# Patient Record
Sex: MALE | Race: WHITE | NOT HISPANIC OR LATINO
[De-identification: names, ages, dates, MRNs, and addresses within clinical notes are randomized per-mention and may not be internally consistent; named-entity substitution may affect disease eponyms.]

---
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
## 2021-07-07 ENCOUNTER — APPOINTMENT (OUTPATIENT)
Dept: DERMATOLOGY | Facility: CLINIC | Age: 21
End: 2021-07-07
Payer: COMMERCIAL

## 2021-07-07 PROCEDURE — 99213 OFFICE O/P EST LOW 20 MIN: CPT

## 2021-07-07 PROCEDURE — 99072 ADDL SUPL MATRL&STAF TM PHE: CPT

## 2021-07-07 RX ORDER — DOXYCYCLINE HYCLATE 100 MG/1
100 CAPSULE ORAL TWICE DAILY
Qty: 60 | Refills: 2 | Status: DISCONTINUED | COMMUNITY
Start: 2020-01-13 | End: 2021-07-07

## 2021-07-07 NOTE — ASSESSMENT
[FreeTextEntry1] : acne stable on duac gel; \par Therapeutic options and their risks and benefits; along with multiple diagnostic possibilities were discussed at length; risks and benefits of further study were discussed;\par \par had significant flare last fall at school;  would avoid doxy again as pt. had GI issues/esophageal irritation; \par  BID prn for flares; take to school; \par Risks and benefits of minocycline were discussed including dizziness;\par \par f/u in winter- sooner prn

## 2021-07-07 NOTE — HISTORY OF PRESENT ILLNESS
[de-identified] : f/u for check of acne;  did very well on duac gel;  took doxy; but had issue with GI irritation from pill\par

## 2021-08-06 ENCOUNTER — TRANSCRIPTION ENCOUNTER (OUTPATIENT)
Age: 21
End: 2021-08-06

## 2021-08-24 ENCOUNTER — TRANSCRIPTION ENCOUNTER (OUTPATIENT)
Age: 21
End: 2021-08-24

## 2022-01-03 ENCOUNTER — TRANSCRIPTION ENCOUNTER (OUTPATIENT)
Age: 22
End: 2022-01-03

## 2022-01-05 ENCOUNTER — APPOINTMENT (OUTPATIENT)
Dept: DERMATOLOGY | Facility: CLINIC | Age: 22
End: 2022-01-05
Payer: COMMERCIAL

## 2022-01-05 PROCEDURE — 99214 OFFICE O/P EST MOD 30 MIN: CPT

## 2022-01-05 NOTE — HISTORY OF PRESENT ILLNESS
[de-identified] : f/u for check of acne; \par  using Duac;  has outbreaks at school\par has MCN prn, but prefers not to take; \par

## 2022-01-05 NOTE — ASSESSMENT
[FreeTextEntry1] : Acne; \par stable now, but prone to exacerbations; \par \par Therapeutic options and their risks and benefits; along with multiple diagnostic possibilities were discussed at length; risks and benefits of further study were discussed;\par \par add tretinoin 0.05 cream;  use both daily;  reserve MCN for severe outbreaks; \par f/u summer, sooner prn

## 2022-01-18 ENCOUNTER — TRANSCRIPTION ENCOUNTER (OUTPATIENT)
Age: 22
End: 2022-01-18

## 2022-05-25 ENCOUNTER — NON-APPOINTMENT (OUTPATIENT)
Age: 22
End: 2022-05-25

## 2022-07-05 ENCOUNTER — APPOINTMENT (OUTPATIENT)
Dept: DERMATOLOGY | Facility: CLINIC | Age: 22
End: 2022-07-05

## 2022-07-05 DIAGNOSIS — D22.4 MELANOCYTIC NEVI OF SCALP AND NECK: ICD-10-CM

## 2022-07-05 DIAGNOSIS — L70.0 ACNE VULGARIS: ICD-10-CM

## 2022-07-05 PROCEDURE — 99214 OFFICE O/P EST MOD 30 MIN: CPT

## 2022-07-05 RX ORDER — CLINDAMYCIN PHOSPHATE AND BENZOYL PEROXIDE 10; 50 MG/G; MG/G
1.2-5 GEL TOPICAL
Qty: 1 | Refills: 5 | Status: ACTIVE | COMMUNITY
Start: 2020-01-13 | End: 1900-01-01

## 2022-07-05 RX ORDER — MINOCYCLINE HYDROCHLORIDE 100 MG/1
100 CAPSULE ORAL TWICE DAILY
Qty: 60 | Refills: 3 | Status: ACTIVE | COMMUNITY
Start: 2021-07-07 | End: 1900-01-01

## 2022-07-05 RX ORDER — TRETINOIN 0.5 MG/G
0.05 CREAM TOPICAL
Qty: 1 | Refills: 3 | Status: ACTIVE | COMMUNITY
Start: 2022-01-05 | End: 1900-01-01

## 2022-07-05 NOTE — PHYSICAL EXAM
[FreeTextEntry3] : Face;  mild residual acne and PIH; \par no nodules; \par \par R anterior neck;  pigmented fleshy papule

## 2022-07-05 NOTE — ASSESSMENT
[FreeTextEntry1] : Acne; \par responding well to topicals; \par Renew Duac/tretinoin; also MCN prn; \par \par \par Therapeutic options and their risks and benefits; along with multiple diagnostic possibilities were discussed at length; risks and benefits of further study were discussed;\par \par nevus R anterior neck; \par + pain, irritation; \par f/u for shave remova;  \par The risks and benefits of the procedure, were explained at length including pain; scar, bleeding; recurrence of lesion(s); need for wound care;\par  \par

## 2022-07-05 NOTE — HISTORY OF PRESENT ILLNESS
[de-identified] : acne on face; \par doing much better on Duac/tretinoin combo; \par takes MCN only on prn basis; \par \par also:  c/o painful mole on neck, desires removal

## 2022-08-02 ENCOUNTER — APPOINTMENT (OUTPATIENT)
Dept: DERMATOLOGY | Facility: CLINIC | Age: 22
End: 2022-08-02

## 2022-11-23 ENCOUNTER — NON-APPOINTMENT (OUTPATIENT)
Age: 22
End: 2022-11-23

## 2023-01-11 ENCOUNTER — APPOINTMENT (OUTPATIENT)
Dept: DERMATOLOGY | Facility: CLINIC | Age: 23
End: 2023-01-11

## 2024-01-05 ENCOUNTER — EMERGENCY (EMERGENCY)
Facility: HOSPITAL | Age: 24
LOS: 0 days | Discharge: HOME | End: 2024-01-05
Attending: STUDENT IN AN ORGANIZED HEALTH CARE EDUCATION/TRAINING PROGRAM
Payer: COMMERCIAL

## 2024-01-05 VITALS
DIASTOLIC BLOOD PRESSURE: 76 MMHG | WEIGHT: 182.98 LBS | TEMPERATURE: 98 F | RESPIRATION RATE: 18 BRPM | HEIGHT: 70 IN | SYSTOLIC BLOOD PRESSURE: 117 MMHG | OXYGEN SATURATION: 100 % | HEART RATE: 74 BPM

## 2024-01-05 VITALS
HEART RATE: 84 BPM | OXYGEN SATURATION: 100 % | DIASTOLIC BLOOD PRESSURE: 69 MMHG | SYSTOLIC BLOOD PRESSURE: 120 MMHG | RESPIRATION RATE: 18 BRPM

## 2024-01-05 DIAGNOSIS — R07.9 CHEST PAIN, UNSPECIFIED: ICD-10-CM

## 2024-01-05 DIAGNOSIS — R07.89 OTHER CHEST PAIN: ICD-10-CM

## 2024-01-05 DIAGNOSIS — I49.1 ATRIAL PREMATURE DEPOLARIZATION: ICD-10-CM

## 2024-01-05 LAB
ALBUMIN SERPL ELPH-MCNC: 4.3 G/DL — SIGNIFICANT CHANGE UP (ref 3.3–5)
ALBUMIN SERPL ELPH-MCNC: 4.3 G/DL — SIGNIFICANT CHANGE UP (ref 3.3–5)
ALP SERPL-CCNC: 77 U/L — SIGNIFICANT CHANGE UP (ref 40–120)
ALP SERPL-CCNC: 77 U/L — SIGNIFICANT CHANGE UP (ref 40–120)
ALT FLD-CCNC: 49 U/L — SIGNIFICANT CHANGE UP (ref 12–78)
ALT FLD-CCNC: 49 U/L — SIGNIFICANT CHANGE UP (ref 12–78)
ANION GAP SERPL CALC-SCNC: 3 MMOL/L — LOW (ref 5–17)
ANION GAP SERPL CALC-SCNC: 3 MMOL/L — LOW (ref 5–17)
AST SERPL-CCNC: 26 U/L — SIGNIFICANT CHANGE UP (ref 15–37)
AST SERPL-CCNC: 26 U/L — SIGNIFICANT CHANGE UP (ref 15–37)
BASOPHILS # BLD AUTO: 0.02 K/UL — SIGNIFICANT CHANGE UP (ref 0–0.2)
BASOPHILS # BLD AUTO: 0.02 K/UL — SIGNIFICANT CHANGE UP (ref 0–0.2)
BASOPHILS NFR BLD AUTO: 0.2 % — SIGNIFICANT CHANGE UP (ref 0–2)
BASOPHILS NFR BLD AUTO: 0.2 % — SIGNIFICANT CHANGE UP (ref 0–2)
BILIRUB SERPL-MCNC: 0.3 MG/DL — SIGNIFICANT CHANGE UP (ref 0.2–1.2)
BILIRUB SERPL-MCNC: 0.3 MG/DL — SIGNIFICANT CHANGE UP (ref 0.2–1.2)
BUN SERPL-MCNC: 25 MG/DL — HIGH (ref 7–23)
BUN SERPL-MCNC: 25 MG/DL — HIGH (ref 7–23)
CALCIUM SERPL-MCNC: 9.3 MG/DL — SIGNIFICANT CHANGE UP (ref 8.5–10.1)
CALCIUM SERPL-MCNC: 9.3 MG/DL — SIGNIFICANT CHANGE UP (ref 8.5–10.1)
CHLORIDE SERPL-SCNC: 106 MMOL/L — SIGNIFICANT CHANGE UP (ref 96–108)
CHLORIDE SERPL-SCNC: 106 MMOL/L — SIGNIFICANT CHANGE UP (ref 96–108)
CO2 SERPL-SCNC: 31 MMOL/L — SIGNIFICANT CHANGE UP (ref 22–31)
CO2 SERPL-SCNC: 31 MMOL/L — SIGNIFICANT CHANGE UP (ref 22–31)
CREAT SERPL-MCNC: 1.2 MG/DL — SIGNIFICANT CHANGE UP (ref 0.5–1.3)
CREAT SERPL-MCNC: 1.2 MG/DL — SIGNIFICANT CHANGE UP (ref 0.5–1.3)
EGFR: 88 ML/MIN/1.73M2 — SIGNIFICANT CHANGE UP
EGFR: 88 ML/MIN/1.73M2 — SIGNIFICANT CHANGE UP
EOSINOPHIL # BLD AUTO: 0.27 K/UL — SIGNIFICANT CHANGE UP (ref 0–0.5)
EOSINOPHIL # BLD AUTO: 0.27 K/UL — SIGNIFICANT CHANGE UP (ref 0–0.5)
EOSINOPHIL NFR BLD AUTO: 3.1 % — SIGNIFICANT CHANGE UP (ref 0–6)
EOSINOPHIL NFR BLD AUTO: 3.1 % — SIGNIFICANT CHANGE UP (ref 0–6)
GLUCOSE SERPL-MCNC: 108 MG/DL — HIGH (ref 70–99)
GLUCOSE SERPL-MCNC: 108 MG/DL — HIGH (ref 70–99)
HCT VFR BLD CALC: 45.2 % — SIGNIFICANT CHANGE UP (ref 39–50)
HCT VFR BLD CALC: 45.2 % — SIGNIFICANT CHANGE UP (ref 39–50)
HGB BLD-MCNC: 15.7 G/DL — SIGNIFICANT CHANGE UP (ref 13–17)
HGB BLD-MCNC: 15.7 G/DL — SIGNIFICANT CHANGE UP (ref 13–17)
IMM GRANULOCYTES NFR BLD AUTO: 0.2 % — SIGNIFICANT CHANGE UP (ref 0–0.9)
IMM GRANULOCYTES NFR BLD AUTO: 0.2 % — SIGNIFICANT CHANGE UP (ref 0–0.9)
LYMPHOCYTES # BLD AUTO: 3.05 K/UL — SIGNIFICANT CHANGE UP (ref 1–3.3)
LYMPHOCYTES # BLD AUTO: 3.05 K/UL — SIGNIFICANT CHANGE UP (ref 1–3.3)
LYMPHOCYTES # BLD AUTO: 35.2 % — SIGNIFICANT CHANGE UP (ref 13–44)
LYMPHOCYTES # BLD AUTO: 35.2 % — SIGNIFICANT CHANGE UP (ref 13–44)
MAGNESIUM SERPL-MCNC: 2.4 MG/DL — SIGNIFICANT CHANGE UP (ref 1.6–2.6)
MAGNESIUM SERPL-MCNC: 2.4 MG/DL — SIGNIFICANT CHANGE UP (ref 1.6–2.6)
MCHC RBC-ENTMCNC: 30.5 PG — SIGNIFICANT CHANGE UP (ref 27–34)
MCHC RBC-ENTMCNC: 30.5 PG — SIGNIFICANT CHANGE UP (ref 27–34)
MCHC RBC-ENTMCNC: 34.7 GM/DL — SIGNIFICANT CHANGE UP (ref 32–36)
MCHC RBC-ENTMCNC: 34.7 GM/DL — SIGNIFICANT CHANGE UP (ref 32–36)
MCV RBC AUTO: 87.8 FL — SIGNIFICANT CHANGE UP (ref 80–100)
MCV RBC AUTO: 87.8 FL — SIGNIFICANT CHANGE UP (ref 80–100)
MONOCYTES # BLD AUTO: 0.72 K/UL — SIGNIFICANT CHANGE UP (ref 0–0.9)
MONOCYTES # BLD AUTO: 0.72 K/UL — SIGNIFICANT CHANGE UP (ref 0–0.9)
MONOCYTES NFR BLD AUTO: 8.3 % — SIGNIFICANT CHANGE UP (ref 2–14)
MONOCYTES NFR BLD AUTO: 8.3 % — SIGNIFICANT CHANGE UP (ref 2–14)
NEUTROPHILS # BLD AUTO: 4.59 K/UL — SIGNIFICANT CHANGE UP (ref 1.8–7.4)
NEUTROPHILS # BLD AUTO: 4.59 K/UL — SIGNIFICANT CHANGE UP (ref 1.8–7.4)
NEUTROPHILS NFR BLD AUTO: 53 % — SIGNIFICANT CHANGE UP (ref 43–77)
NEUTROPHILS NFR BLD AUTO: 53 % — SIGNIFICANT CHANGE UP (ref 43–77)
PLATELET # BLD AUTO: 232 K/UL — SIGNIFICANT CHANGE UP (ref 150–400)
PLATELET # BLD AUTO: 232 K/UL — SIGNIFICANT CHANGE UP (ref 150–400)
POTASSIUM SERPL-MCNC: 3.4 MMOL/L — LOW (ref 3.5–5.3)
POTASSIUM SERPL-MCNC: 3.4 MMOL/L — LOW (ref 3.5–5.3)
POTASSIUM SERPL-SCNC: 3.4 MMOL/L — LOW (ref 3.5–5.3)
POTASSIUM SERPL-SCNC: 3.4 MMOL/L — LOW (ref 3.5–5.3)
PROT SERPL-MCNC: 7.9 GM/DL — SIGNIFICANT CHANGE UP (ref 6–8.3)
PROT SERPL-MCNC: 7.9 GM/DL — SIGNIFICANT CHANGE UP (ref 6–8.3)
RBC # BLD: 5.15 M/UL — SIGNIFICANT CHANGE UP (ref 4.2–5.8)
RBC # BLD: 5.15 M/UL — SIGNIFICANT CHANGE UP (ref 4.2–5.8)
RBC # FLD: 12.3 % — SIGNIFICANT CHANGE UP (ref 10.3–14.5)
RBC # FLD: 12.3 % — SIGNIFICANT CHANGE UP (ref 10.3–14.5)
SODIUM SERPL-SCNC: 140 MMOL/L — SIGNIFICANT CHANGE UP (ref 135–145)
SODIUM SERPL-SCNC: 140 MMOL/L — SIGNIFICANT CHANGE UP (ref 135–145)
TROPONIN I, HIGH SENSITIVITY RESULT: 68.51 NG/L — SIGNIFICANT CHANGE UP
TROPONIN I, HIGH SENSITIVITY RESULT: 68.51 NG/L — SIGNIFICANT CHANGE UP
TROPONIN I, HIGH SENSITIVITY RESULT: 72.31 NG/L — SIGNIFICANT CHANGE UP
TROPONIN I, HIGH SENSITIVITY RESULT: 72.31 NG/L — SIGNIFICANT CHANGE UP
WBC # BLD: 8.67 K/UL — SIGNIFICANT CHANGE UP (ref 3.8–10.5)
WBC # BLD: 8.67 K/UL — SIGNIFICANT CHANGE UP (ref 3.8–10.5)
WBC # FLD AUTO: 8.67 K/UL — SIGNIFICANT CHANGE UP (ref 3.8–10.5)
WBC # FLD AUTO: 8.67 K/UL — SIGNIFICANT CHANGE UP (ref 3.8–10.5)

## 2024-01-05 PROCEDURE — 80053 COMPREHEN METABOLIC PANEL: CPT

## 2024-01-05 PROCEDURE — 36415 COLL VENOUS BLD VENIPUNCTURE: CPT

## 2024-01-05 PROCEDURE — 84484 ASSAY OF TROPONIN QUANT: CPT

## 2024-01-05 PROCEDURE — 99223 1ST HOSP IP/OBS HIGH 75: CPT

## 2024-01-05 PROCEDURE — 71045 X-RAY EXAM CHEST 1 VIEW: CPT

## 2024-01-05 PROCEDURE — 83735 ASSAY OF MAGNESIUM: CPT

## 2024-01-05 PROCEDURE — 85025 COMPLETE CBC W/AUTO DIFF WBC: CPT

## 2024-01-05 PROCEDURE — 93005 ELECTROCARDIOGRAM TRACING: CPT

## 2024-01-05 PROCEDURE — 96374 THER/PROPH/DIAG INJ IV PUSH: CPT

## 2024-01-05 PROCEDURE — 93010 ELECTROCARDIOGRAM REPORT: CPT

## 2024-01-05 PROCEDURE — 99285 EMERGENCY DEPT VISIT HI MDM: CPT

## 2024-01-05 PROCEDURE — 99285 EMERGENCY DEPT VISIT HI MDM: CPT | Mod: 25

## 2024-01-05 PROCEDURE — 71045 X-RAY EXAM CHEST 1 VIEW: CPT | Mod: 26

## 2024-01-05 RX ORDER — ONDANSETRON 8 MG/1
4 TABLET, FILM COATED ORAL ONCE
Refills: 0 | Status: COMPLETED | OUTPATIENT
Start: 2024-01-05 | End: 2024-01-05

## 2024-01-05 RX ORDER — POTASSIUM CHLORIDE 20 MEQ
40 PACKET (EA) ORAL ONCE
Refills: 0 | Status: DISCONTINUED | OUTPATIENT
Start: 2024-01-05 | End: 2024-01-05

## 2024-01-05 RX ADMIN — ONDANSETRON 4 MILLIGRAM(S): 8 TABLET, FILM COATED ORAL at 03:43

## 2024-01-05 NOTE — CONSULT NOTE ADULT - PROBLEM SELECTOR RECOMMENDATION 9
pt had sudden right sided chest pain - serial troponin levels negative, ECG without ST-T wave changes   ACS ruled out. No evidence myocardial damage. no associated sx  He does have hx PACs which were seen on ECG and tele while in ED. Do not suspect severity of chest pain related to PACs. He did take doxycycline before bed without food- this may be due to pill esophagitis.   Stable from cardiac standpoint for DC.

## 2024-01-05 NOTE — CONSULT NOTE ADULT - PROBLEM SELECTOR RECOMMENDATION 2
Pt reported hx frequent PACs; was seen by Dr. Delvalle in 2022 and referred to EP. Ablation was recommended but patient was hesitant to undergo procedure.   Unclear if current sx are related to PACs but pt should follow up with cardiologist as outpatient. would repeat monitor and reassess need for medical tx/ablation     (Offered to have Dr. Delvalle see patient before DC but patient requested to be discharged)

## 2024-01-05 NOTE — ED PROVIDER NOTE - NSFOLLOWUPINSTRUCTIONS_ED_ALL_ED_FT
** Follow up with cardiology.      ** Go to the nearest Emergency Department if you experience any new or concerning symptoms, such as:   - worsening pain  - chest pain  - difficulty breathing  - passing out  - unable to eat or drink  - unable to move or feel part of your body  - fever, chills

## 2024-01-05 NOTE — CONSULT NOTE ADULT - SUBJECTIVE AND OBJECTIVE BOX
CHIEF COMPLAINT: chest pain     HPI:  Iraj is a 22 yo male with a hx frequent PACS presenting with sudden right sided chest pain. Pt reports waking up from sleep with severe pain lasting 2-3 hours. He had no associated sx. Never had this pain before. Denies recent heavy lifting, exercise. Pain was not pleuritic, reproducible or positional. He has not had recent exertional sx. Denies palpitations, dizziness, shortness of breath, LE edema, orthopnea.     He does report taking doxycycline (prescribed for acne) just before bed without food.     He did then note having seen Dr. Delvalle in 2022 for frequent PACS with subsequent referral to EP. Ablation was recommended but patient has been hesitant to undergo procedure.     In the ED, he was afebrile, hemodynamically stable saturating well on room air.   Labs notable for grossly unremarkable CBC, CMP with K 3.4, BUN 25, trop neg x2   CXR unremarkable.   ECG NSR with frequent PACs     PAST MEDICAL / SURGICAL HISTORY:  PAST MEDICAL & SURGICAL HISTORY:      SOCIAL HISTORY:   Alcohol: Denied  Smoking: Nonsmoker  Drug Use: Denied  Marital Status:     FAMILY HISTORY: FAMILY HISTORY:      MEDICATIONS  (STANDING):    MEDICATIONS  (PRN):      Allergies    No Known Allergies    Intolerances        REVIEW OF SYSTEMS:  CONSTITUTIONAL: No weakness, fevers or chills  Eyes: No visual changes  NECK: No pain or stiffness  RESPIRATORY: No cough, wheezing, hemoptysis; No shortness of breath  CARDIOVASCULAR: +chest pain, no palpitations  GASTROINTESTINAL: No abdominal pain. No nausea, vomiting, or hematemesis; No diarrhea or constipation. No melena or hematochezia.  GENITOURINARY: No dysuria, frequency or hematuria  NEUROLOGICAL: No numbness.  SKIN: No itching or rash  All other review of systems is negative unless indicated above    VITAL SIGNS:   Vital Signs Last 24 Hrs  T(C): 36.9 (05 Jan 2024 07:26), Max: 36.9 (05 Jan 2024 07:15)  T(F): 98.5 (05 Jan 2024 07:26), Max: 98.5 (05 Jan 2024 07:15)  HR: 84 (05 Jan 2024 08:28) (74 - 90)  BP: 120/69 (05 Jan 2024 08:28) (117/76 - 128/73)  BP(mean): 82 (05 Jan 2024 08:28) (81 - 87)  RR: 18 (05 Jan 2024 08:28) (18 - 20)  SpO2: 100% (05 Jan 2024 08:28) (100% - 100%)    Parameters below as of 05 Jan 2024 08:28  Patient On (Oxygen Delivery Method): room air        I&O's Summary      PHYSICAL EXAM:  Constitutional: NAD, awake and alert  HEENT:  EOMI,  Pupils round, No oral cyanosis.  Pulmonary: Non-labored, breath sounds are clear bilaterally, No wheezing, rales or rhonchi  Cardiovascular: S1 and S2, regular rate and rhythm, no Murmurs, gallops or rubs  Gastrointestinal: Bowel Sounds present, soft, nontender.   Lymph: No peripheral edema. No cervical lymphadenopathy.  Neurological: Alert, no focal deficits  Skin: No rashes.  Psych:  Mood & affect appropriate    LABS:                        15.7   8.67  )-----------( 232      ( 05 Jan 2024 03:15 )             45.2     05 Jan 2024 03:15    140    |  106    |  25     ----------------------------<  108    3.4     |  31     |  1.20     Ca    9.3        05 Jan 2024 03:15  Mg     2.4       05 Jan 2024 03:15    TPro  7.9    /  Alb  4.3    /  TBili  0.3    /  DBili  x      /  AST  26     /  ALT  49     /  AlkPhos  77     05 Jan 2024 03:15

## 2024-01-05 NOTE — ED ADULT NURSE NOTE - OBJECTIVE STATEMENT
pt presents to ED c/o chest pain. pt states pain woke him up and he was hurdled over from the severity. upon assessment in ED, pain has subsided. pt hx PAC. pt "needs ablation but has been putting it off" as per mother at bedside. pt endorsing nausea and vomiting; pt states "I think I threw up because I got anxious from the pain". pt took 2 TUMS PTA with a little relief. pt denies any other pmhx. pt is alert with no further complaints. EKG done and heart monitoring initiated.

## 2024-01-05 NOTE — ED ADULT TRIAGE NOTE - CHIEF COMPLAINT QUOTE
Patient ambulates to ER from home c/o chest pain. Pt stated that pain to the right side of chest started 1 hour ago. Endorses sob. PMHx PAC. NKA. Pt appeared to be ion no distress in triage. EKG in progress.

## 2024-01-05 NOTE — ED PROVIDER NOTE - PROGRESS NOTE DETAILS
Rosa Whitmore MD, Attending  cards at bedside Rosa Whitmore MD, Attending  Pt's repeat trop negative, but rising. Pt and mother strongly prefer to see cardiology in the AM for second opinion.

## 2024-01-05 NOTE — ED PROVIDER NOTE - CLINICAL SUMMARY MEDICAL DECISION MAKING FREE TEXT BOX
ddx includes, but is not limited to the following: pleurisy, ACS, dysrhythmia, pericarditis, myocarditis, low suspicion for PE    plan: ED chest pain labs, reassess for need for repeat trop, cards consult.     EKG: independently interpreted by me - sinus with frequent PAC, not heart block.

## 2024-01-05 NOTE — ED PROVIDER NOTE - OBJECTIVE STATEMENT
24 yo M hx of PACs, pw acute onset right side chest pain awaking him up from sleep at rougly 1:30AM. Pain was not exertional, pleuritic, positional. episode self-resolved after 2 hours. In the ED, pt well appearing, with nromal vitals. 22 yo M hx of PACs, pw acute onset right side chest pain awaking him up from sleep at rougly 1:30AM. Pain was not exertional, pleuritic, positional. episode self-resolved after 2 hours. In the ED, pt well appearing, with nromal vitals.

## 2024-01-05 NOTE — ED PROVIDER NOTE - PATIENT PORTAL LINK FT
You can access the FollowMyHealth Patient Portal offered by [facility] by registering at the following website: [email or web address]. By joining [employer]’s FollowMyHealth portal, you will also be able to view your health information using other applications (apps) compatible with our system. You can access the FollowMyHealth Patient Portal offered by [facility] by registering at the following website: [email or web address]. By joining [employer]’s FollowMyHealth portal, you will also be able to view your health information using other applications (apps) compatible with our system.

## 2024-02-23 ENCOUNTER — APPOINTMENT (OUTPATIENT)
Dept: ORTHOPEDIC SURGERY | Facility: CLINIC | Age: 24
End: 2024-02-23
Payer: COMMERCIAL

## 2024-02-23 DIAGNOSIS — S86.812A STRAIN OF OTHER MUSCLE(S) AND TENDON(S) AT LOWER LEG LEVEL, LEFT LEG, INITIAL ENCOUNTER: ICD-10-CM

## 2024-02-23 DIAGNOSIS — Z78.9 OTHER SPECIFIED HEALTH STATUS: ICD-10-CM

## 2024-02-23 PROCEDURE — 99203 OFFICE O/P NEW LOW 30 MIN: CPT

## 2024-02-27 PROBLEM — S86.812A STRAIN OF CALF MUSCLE, LEFT, INITIAL ENCOUNTER: Status: ACTIVE | Noted: 2024-02-27

## 2024-02-27 NOTE — IMAGING
[de-identified] : Constitutional: Healthy, and well nourished in no acute distress.  Psych: Calm, cooperative, grossly normal  Eyes: Normal, sclera non-icteric  Ears, Nose, Mouth, Throat: External inspection of nose and ears does not reveal any scars or masses  Head: Normocephalic  Neck: Neck appears supple without sign of limited or painful ROM  Respiratory: Normal effort, no respiratory distress, no cyanosis  Cardiovascular: Visualized extremities without edema or varicosities, warm, brisk cap refill  Abdominal/GI: Not examined  Skin: No rashes on the extremity examined.  Neurological: Patient is awake and alert     Tibia: Inspection:  Swelling: none Ecchymosis: none Masses: none appreciated Palpation: NONE  Medial left gastro head is mildly tender No overt calf swelling nice muscle bulk wiht overt palpable deformity. No overt ecchymosis Negative Garcia Has full active ROM of ankle achilles is mildly tender but intact without defect  Fibula: Inspection:  normal Mass: none Palpation: none  Special tests:  One leg hop test: Right: neg Left: deferred but can single leg rise up on the left- mild ache 5/5 with left ankle resisted ROM  Normal gait +2 patellar and +2 achilles reflexes Able to walk on heels and toes.

## 2024-02-27 NOTE — DISCUSSION/SUMMARY
[de-identified] : The patient was advised of the diagnosis. The natural history of the pathology was explained in full to the patient  in layman's terms.  medial gastroc left calf strain Here is the plan that we have set forth today. 1. ice soreness 2. heat and stretch durign day 3. modify activities 4. see me back in 1 week if needed 5. if any further pain or swelling also available Monday in [place] The understands the plan of care as described above.  All questions have been answered. Thank you for allowing me to care for KEYSHA. Sincerely, Jeane Sultana, DO, FAAP, [facility]

## 2024-02-27 NOTE — HISTORY OF PRESENT ILLNESS
[Sudden] : sudden [Dull/Aching] : dull/aching [Occasional] : occasional [de-identified] : Pain in the left calf- this morning. see injury report below otherwise healthy and well Has full ROM of ankl;e but pain wiht walking feels sore no overt swelling or bruising  PMhx + for: PAC- followed by Cardiology Dr. Rodriguez Nonmedicated  Review of Systems:  Constitutional:  no fever, fatigue or recent weight loss  HEENT: negative  CV: negative  Pulm: negative  GI: negative  : negative  Neuro: negative  Skin: negative  Endocrine: negative  Heme: negative  MSK: See HPI. [] : no [FreeTextEntry1] : Left leg [FreeTextEntry3] : 2/23/24 [FreeTextEntry5] : Patient reports getting out of bed and felt a pop.

## 2024-03-04 ENCOUNTER — APPOINTMENT (OUTPATIENT)
Dept: ORTHOPEDIC SURGERY | Facility: CLINIC | Age: 24
End: 2024-03-04
Payer: COMMERCIAL

## 2024-03-04 DIAGNOSIS — M25.522 PAIN IN LEFT ELBOW: ICD-10-CM

## 2024-03-04 DIAGNOSIS — M25.529 PAIN IN UNSPECIFIED ELBOW: ICD-10-CM

## 2024-03-04 PROCEDURE — 73080 X-RAY EXAM OF ELBOW: CPT | Mod: LT

## 2024-03-04 PROCEDURE — 99214 OFFICE O/P EST MOD 30 MIN: CPT

## 2024-03-04 NOTE — IMAGING
[de-identified] : Elbow Exam: LEFT Elbow  Inspection:            	Effusion: none           	Ecchymosis: none           	Alignment: Grossly NORMAL   Palpation:           	Tenderness: none today          	Crepitus: ABSENT          	Masses: ABSENT ROM: NORMAL ROM of the elbow           Pain was NOT elicited with ROM   Strength: FULL strength about elbow including flexion, extension, pronation and supination   Elbow Special Tests:            	Stable: yes 	Varus Stress: NEGATIVE  	Valgus Stress: NEGATIVE  	Tinel: NEGATIVE             Reproduction of Ulnar nerve Snapping syndrome: Not today but location makes sense for his snap sensation

## 2024-03-04 NOTE — DATA REVIEWED
[FreeTextEntry1] : 3 view left elbow- normal osseous exam  no fracture no effusion alignment anatomic.

## 2024-03-04 NOTE — DISCUSSION/SUMMARY
[de-identified] : The patient was advised of the diagnosis. The natural history of the pathology was explained in full to the patient  in layman's terms.  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 1. PT for ulnar glide as well as some Upper body work 2. augment the activities in the gym 3. try a compression sleeve 4. follow up in 1 month - can consider advanced imaging if not improving The understands the plan of care as described above.  All questions have been answered. Thank you for allowing me to care for KEYSHA. Sincerely, Jeane Sultana, DO, FAAP, [facility]

## 2024-04-18 ENCOUNTER — APPOINTMENT (OUTPATIENT)
Dept: ORTHOPEDIC SURGERY | Facility: CLINIC | Age: 24
End: 2024-04-18
Payer: COMMERCIAL

## 2024-04-18 VITALS — HEIGHT: 70 IN | WEIGHT: 165 LBS | BODY MASS INDEX: 23.62 KG/M2

## 2024-04-18 DIAGNOSIS — G56.22 LESION OF ULNAR NERVE, LEFT UPPER LIMB: ICD-10-CM

## 2024-04-18 PROCEDURE — 99213 OFFICE O/P EST LOW 20 MIN: CPT

## 2024-04-24 ENCOUNTER — APPOINTMENT (OUTPATIENT)
Dept: MRI IMAGING | Facility: CLINIC | Age: 24
End: 2024-04-24

## 2024-05-27 NOTE — HISTORY OF PRESENT ILLNESS
[Sudden] : sudden [Dull/Aching] : dull/aching [Occasional] : occasional [de-identified] : 3/4/24: Returns to follow up on left calf. Patient wants to discuss left elbow pain (no injury). His calf is fine.  He is back to full on basketball wihtout issue. The left elbow has been an issue since october.  No injury or fall. With certain lifts he gets a snap in the back of the elbow. its a few secs of pain then resolved. No shooting pain No overt paresthesias It occurs with weight tricep extension over head as well as some heavy bench press. He is wondering what can be done about it  Pain in the left calf- this morning. see injury report below otherwise healthy and well Has full ROM of ankl;e but pain wiht walking feels sore no overt swelling or bruising  PMhx + for: PAC- followed by Cardiology Dr. Rodriguez Nonmedicated  Review of Systems:  Constitutional:  no fever, fatigue or recent weight loss  HEENT: negative  CV: negative  Pulm: negative  GI: negative  : negative  Neuro: negative  Skin: negative  Endocrine: negative  Heme: negative  MSK: See HPI. [] : no [FreeTextEntry1] : Left leg [FreeTextEntry5] : Patient reports getting out of bed and felt a pop. [FreeTextEntry3] : 2/23/24 No indicators present

## 2024-06-23 ENCOUNTER — NON-APPOINTMENT (OUTPATIENT)
Age: 24
End: 2024-06-23

## 2025-01-15 ENCOUNTER — OUTPATIENT (OUTPATIENT)
Dept: OUTPATIENT SERVICES | Facility: HOSPITAL | Age: 25
LOS: 1 days | End: 2025-01-15
Payer: COMMERCIAL

## 2025-01-15 PROCEDURE — 93005 ELECTROCARDIOGRAM TRACING: CPT

## 2025-01-15 PROCEDURE — 92960 CARDIOVERSION ELECTRIC EXT: CPT

## 2025-01-16 DIAGNOSIS — I47.11 INAPPROPRIATE SINUS TACHYCARDIA, SO STATED: ICD-10-CM

## 2025-01-17 DIAGNOSIS — I47.11 INAPPROPRIATE SINUS TACHYCARDIA, SO STATED: ICD-10-CM

## 2025-01-17 PROBLEM — Z78.9 OTHER SPECIFIED HEALTH STATUS: Chronic | Status: INACTIVE | Noted: 2024-01-07 | Resolved: 2025-01-15
